# Patient Record
Sex: MALE | Race: BLACK OR AFRICAN AMERICAN | NOT HISPANIC OR LATINO | Employment: FULL TIME | URBAN - METROPOLITAN AREA
[De-identification: names, ages, dates, MRNs, and addresses within clinical notes are randomized per-mention and may not be internally consistent; named-entity substitution may affect disease eponyms.]

---

## 2017-11-01 DIAGNOSIS — E78.5 HYPERLIPIDEMIA: ICD-10-CM

## 2017-11-01 DIAGNOSIS — B20 HUMAN IMMUNODEFICIENCY VIRUS (HIV) DISEASE (HCC): ICD-10-CM

## 2017-11-01 DIAGNOSIS — I10 ESSENTIAL (PRIMARY) HYPERTENSION: ICD-10-CM

## 2017-11-01 DIAGNOSIS — E03.9 HYPOTHYROIDISM: ICD-10-CM

## 2017-11-01 DIAGNOSIS — Z12.5 ENCOUNTER FOR SCREENING FOR MALIGNANT NEOPLASM OF PROSTATE: ICD-10-CM

## 2017-11-01 DIAGNOSIS — Z11.3 ENCOUNTER FOR SCREENING FOR INFECTIONS WITH PREDOMINANTLY SEXUAL MODE OF TRANSMISSION: ICD-10-CM

## 2017-11-30 ENCOUNTER — ALLSCRIPTS OFFICE VISIT (OUTPATIENT)
Dept: OTHER | Facility: OTHER | Age: 57
End: 2017-11-30

## 2018-01-12 ENCOUNTER — ALLSCRIPTS OFFICE VISIT (OUTPATIENT)
Dept: OTHER | Facility: OTHER | Age: 58
End: 2018-01-12

## 2018-01-12 ENCOUNTER — GENERIC CONVERSION - ENCOUNTER (OUTPATIENT)
Dept: OTHER | Facility: OTHER | Age: 58
End: 2018-01-12

## 2018-01-15 NOTE — PROGRESS NOTES
History of Present Illness  HPI: Patient here for routine follow-up for his HIV  Patient has been nonadherent with his follow-up with his appointments  However he has been very adherent taking his Tivicay and Truvada with claimed near 100% adherence  He denies any notable side effects  He has run out of his other medications but continues to take his ART  He is feeling somewhat run down due to running out of some of his neuropsychiatric medications  HIV Follow-up (Brief): The patient is being seen for a routine clinic follow-up of HIV infection  no fever lethargy no depression no night sweats no weight loss no decreased appetite no cough no shortness of breath no thrush no nausea no vomiting no diarrhea no headache      Active Problems    1  AIDS (042) (B20)   2  Bipolar disorder (296 80) (F31 9)   3  Chest pain (786 50) (R07 9)   4  Condyloma acuminata (078 11) (A63 0)   5  Depression with anxiety (300 4) (F41 8)   6  Encounter for screening for infections with a predominantly sexual mode of transmission   (V74 5) (Z11 3)   7  Human immunodeficiency virus (HIV) disease (042) (B20)   8  Hyperlipidemia (272 4) (E78 5)   9  Hypertension (401 9) (I10)   10  Hypothyroidism (244 9) (E03 9)   11  Insomnia (780 52) (G47 00)   12  Migraine, unspecified, not intractable, without status migrainosus (346 90) (G43 909)   13  Need for pneumococcal vaccination (V03 82) (Z23)   14  Screening for colon cancer (V76 51) (Z12 11)   15  Special screening examination for neoplasm of prostate (V76 44) (Z12 5)    Past Medical History    1  History of Visit for pre-operative examination (V72 84) (Z70 043)    Family History    1  Family history of    2  Family history of End Stage Renal Disease    3  Family history of Diabetes Mellitus (V18 0)    4   Family history of Reported Family History Of Heart Disease    Social History    · Being A Social Drinker   · Former smoker (Y79 81) (A60 418)   · Marital History - Single   · Occupation:    Current Meds   1  Benicar 20 MG Oral Tablet (Olmesartan Medoxomil); take 1 tablet by mouth daily; Therapy: 55LZH1644 to (Evaluate:88Vuv8545)  Requested for: 29Jan2016; Last   Rx:29Jan2016 Ordered   2  Butalbital-APAP-Caffeine -40 MG Oral Tablet; TAKE 1 TABLET EVERY 4 TO 6   HOURS AS NEEDED FOR PAIN;   Therapy: 11VHM8224 to Recorded   3  Centrum Silver Oral Tablet; TAKE 1 TABLET DAILY; Therapy: 98KKW2182 to Recorded   4  Fenofibrate 145 MG Oral Tablet (Tricor); take 1 tablet by mouth every day; Therapy: 43IYN8714 to (Evaluate:00Tau1060)  Requested for: 35CJH2173; Last   Rx:24Nov2016 Ordered   5  KlonoPIN 0 5 MG Oral Tablet (ClonazePAM); TAKE 1 TABLET TWICE DAILY AS NEEDED; Therapy: 59QKS1497 to Recorded   6  LamoTRIgine 100 MG Oral Tablet; TAKE 1 TABLET in AM and 1 5 tab in PM;   Therapy: 49BDC4993 to Recorded   7  Levothyroxine Sodium 50 MCG Oral Tablet (Synthroid); take 1 tablet by mouth daily as   directed; Therapy: 51AMQ1088 to (Evaluate:19Apr2017)  Requested for: 89AWC5945; Last   Rx:21Oct2016; Status: ACTIVE - Renewal Denied Ordered   8  Lexapro 20 MG Oral Tablet (Escitalopram Oxalate); TAKE 1 TABLET DAILY; Therapy: 72BMA9255 to (Evaluate:05Pov4791) Recorded   9  Olmesartan Medoxomil 20 MG Oral Tablet; take 1 tablet by mouth daily; Therapy: 06EIH1140 to (Evaluate:87Lxh4469)  Requested for: 76DJN9088; Last   Rx:13Mar2017 Ordered   10  Tivicay 50 MG Oral Tablet; Take 1 tablet daily; Last Rx:01Nov2017 Ordered   11  Truvada 200-300 MG Oral Tablet; Take 1 tablet daily; Last Rx:01Nov2017 Ordered   12  Wellbutrin  MG Oral Tablet Extended Release 24 Hour (BuPROPion HCl ER (XL));    TAKE 1 TABLET BY MOUTH EVERY DAY AS DIRECTED; Therapy: 36WIU8981 to Recorded   13  Zetia 10 MG Oral Tablet (Ezetimibe); take 1 tablet by mouth every day; Therapy: 14LCC5505 to (Evaluate:07Yam2625)  Requested for: 22QNF8909; Last    Rx:10Mar2016 Ordered   14   Zolpidem Tartrate 10 MG Oral Tablet; Therapy: 06VVN1018 to Recorded    Allergies    1  No Known Drug Allergies    Vitals  Signs   Recorded: 29KZG4853 01:33PM   Temperature: 97 1 F  Heart Rate: 68  Respiration: 14  Blood Pressure: 102 mm Hg  Blood Pressure: 72 mm Hg  Height: 6 ft 4 in  Weight: 239 lb   BMI Calculated: 29 09 kg/m2  BSA Calculated: 2 39 m2  O2 Saturation: 98    Physical Exam    Constitutional   General appearance: No acute distress, well appearing and well nourished  Ears, Nose, Mouth, and Throat   Oropharynx: Normal with no erythema, edema, exudate or lesions  Pulmonary   Auscultation of lungs: Clear to auscultation, equal breath sounds bilaterally, no wheezes, no rales, no rhonci  Cardiovascular   Auscultation of heart: Normal rate and rhythm, normal S1 and S2, without murmurs  Examination of extremities for edema and/or varicosities: Normal     Abdomen   Abdomen: Non-tender, no masses  Liver and spleen: No hepatomegaly or splenomegaly  Lymphatic   Palpation of lymph nodes in neck: No lymphadenopathy  Assessment    1  AIDS (042) (B20)   2  Hyperlipidemia (272 4) (E78 5)   3  Condyloma acuminata (078 11) (A63 0)   4  Migraine, unspecified, not intractable, without status migrainosus (346 90) (G43 909)    Plan    1  (1) CBC/PLT/DIFF; Status:Active; Requested for:30Apr2018;    2  (1) CD4-CD8 RATIO PROFILE; Status:Active; Requested for:30Apr2018;    3  (1) COMPREHENSIVE METABOLIC PANEL; Status:Active; Requested for:30Apr2018;    4  (1) HIV-1 RNA QUANTITATIVE; [Do Not Release]; Status:Active; Requested for:30Apr2018;       5  Follow-up visit in 6 months Evaluation and Treatment  Follow-up  Status: Hold For -   Scheduling  Requested for: 31HCZ5066    Discussion/Summary  Discussion Summary:   HIV-doing well on Tivicay and Truvada with an undetectable viral load and a CD4 count in the 700  Continue a RT, recheck labs in 5 months, follow up in 6 months   The patient will continue to try to find a HIV provider much closer to his home  Anal dysplasia-associated with HPV infection  Follow up with Colorectal status post resection    Migraine headaches-the seemed to have resolved and therefore it is very unlikely these are due to Tivicay    Dyslipidemia-will hold on changing the Truvada to Descovy the as the Truvada is more effective at suppressing lipid levels  Would defer any change in Truvada to Descovy to his new HIV provider  Counseling Documentation With Imm: The patient was counseled regarding diagnostic results, instructions for management, prognosis, risks and benefits of treatment options, importance of compliance with treatment  Medication SE Review and Pt Understands Tx: Possible side effects of new medications were reviewed with the patient/guardian today        Signatures   Electronically signed by : Benjamin Evans MD; Nov 30 2017  1:52PM EST                       (Author)    Electronically signed by : Benjamin Evans MD; Nov 30 2017  1:53PM EST                       (Author)

## 2018-01-22 VITALS
RESPIRATION RATE: 14 BRPM | SYSTOLIC BLOOD PRESSURE: 102 MMHG | WEIGHT: 239 LBS | HEART RATE: 68 BPM | BODY MASS INDEX: 29.1 KG/M2 | TEMPERATURE: 97.1 F | DIASTOLIC BLOOD PRESSURE: 72 MMHG | OXYGEN SATURATION: 98 % | HEIGHT: 76 IN

## 2018-01-24 VITALS
DIASTOLIC BLOOD PRESSURE: 72 MMHG | TEMPERATURE: 97.3 F | SYSTOLIC BLOOD PRESSURE: 104 MMHG | HEIGHT: 76 IN | BODY MASS INDEX: 28.98 KG/M2 | RESPIRATION RATE: 16 BRPM | HEART RATE: 72 BPM | WEIGHT: 238 LBS | OXYGEN SATURATION: 99 %

## 2018-02-26 NOTE — PROGRESS NOTES
Assessment    1  Encounter for preventive health examination (V70 0) (Z00 00)    Plan  Bipolar disorder    · From  LamoTRIgine 100 MG Oral Tablet TAKE 1 TABLET in AM and 1 5 tab in  PM To LamoTRIgine 100 MG Oral Tablet take one tab in PM for 14days then one tab bid   · BuPROPion HCl ER (XL) 150 MG Oral Tablet Extended Release 24 Hour; take 1  tab daily for 14days then two tabs daily thereafter  Depression with anxiety    · ClonazePAM 0 5 MG Oral Tablet (KlonoPIN); TAKE 1 TABLET TWICE DAILY AS  NEEDED   · From  Lexapro 20 MG Oral Tablet TAKE 1 TABLET DAILY To Escitalopram  Oxalate 20 MG Oral Tablet take 1/2 tablet daily for 14days then one tab daily thereafter  Health Maintenance    · Begin a limited exercise program ; Status:Complete;   Done: 04VRZ9206   · Brush your teeth {freq1} and floss at least once a day ; Status:Complete;   Done:  63MZQ2274   · Drink plenty of fluids ; Status:Complete;   Done: 90JLK5237   · Limit your use of alcohol to 2 drinks or cans of beer a day ; Status:Complete;   Done:  56HYB2698   · Some eating tips that can help you lose weight ; Status:Complete;   Done: 29LNC8533   · We recommend that you follow these steps to lower your risk of osteoporosis  ;  Status:Complete;   Done: 56ZQP8204  Need for Tdap vaccination    · Tdap    Discussion/Summary  health maintenance visit Impression: healthy adult male  eats an adequate diet Currently, he has an adequate exercise regimen  Prostate cancer screening: the risks and benefits of prostate cancer screening were discussed and prostate cancer screening is current  Testicular cancer screening: testicular cancer screening is not indicated  Colorectal cancer screening: the risks and benefits of colorectal cancer screening were discussed, the next colonoscopy is due now and Patient plans to get a referral for colonoscopy once he establishes with new PCP   The risks and benefits of immunizations were discussed, immunizations are needed and immunizations will be given as outlined in the orders  Advice and education were given regarding nutrition, aerobic exercise, cardiovascular risk reduction, weight bearing exercise, weight loss, alcohol use, calcium supplements and vitamin D supplements  Patient discussion: discussed with the patient, 50 minute visit, greater than half of the time was spent on counseling  bipolar with MDD and anxiety - patient will need to be restarted on his regimen that was discontinued because he could not return for routine follow with his specialist  Plan to establish care with a new PCP and will need to find a new psychiatrist as well  Call office in the interim  Possible side effects of new medications were reviewed with the patient/guardian today  The treatment plan was reviewed with the patient/guardian  The patient/guardian understands and agrees with the treatment plan      History of Present Illness  HM, Adult Male: The patient is being seen for a health maintenance evaluation  The last health maintenance visit was 1 5 year(s) ago  Social History: Work status: working full time and occupation: [employer]  The patient is a former cigarette smoker  He reports occasional alcohol use  General Health: The patient's health since the last visit is described as fair  He has regular dental visits  The patient brushes time(s) a day and flosses time(s) a day  He complains of vision problems (Followed by Dr Jessie Richter)  Vision care includes wearing glasses  He has hearing loss  Hearing is normal  Immunizations status: not up to date The patient needs the following immunization(s): tetanus vaccine and zoster vaccine  Lifestyle:  He consumes a diverse and healthy diet  He is on a low salt and limits junk food diet  He exercises regularly  He exercises 3 or more times per week  Exercise includes walking  He does not use tobacco  The patient is a former cigarette smoker  He consumes alcohol   He reports occasional alcohol use and drinking 2 drinks per week  He typically drinks beer and wine  Reproductive health:  the patient is not sexually active  Screening: Prostate cancer screening includes last prostate-specific antigen testing 12/2017  Colorectal cancer screening includes last colonoscopy done 2010 and Followed by Dr Jimy Kevin  Metabolic screening includes lipid profile performed 12/2017, glucose screening performed 12/2017, thyroid function test performed 12/2017 and no previous DEXA  Safety elements used: seat belt, sunscreen and smoke detector  HPI: 60yo male with bipolar, depression with anxiety, HIV/AIDS, hypothyroidism, migraines, HLD here for yearly physical  He is planning to transfer his care to NH  He started a new position at [employer]  He feels well  He stopped his medications from Neurology, klonipin, lamictal, wellbutrin and lexapro about six months ago  He feels depressed now  At times he has difficulty getting his day started  He has been cycling through thoughts, has had a few episodes of outbursts  Review of Systems    Constitutional: recent weight loss  Cardiovascular: No complaints of slow heart rate, no fast heart rate, no chest pain, no palpitations, no leg claudication, no lower extremity  Respiratory: No complaints of shortness of breath, no wheezing, no cough, no SOB on exertion, no orthopnea or PND  Gastrointestinal: constipation  Genitourinary: No complaints of dysuria, no incontinence, no hesitancy, no nocturia, no genital lesion, no testicular pain  Musculoskeletal: joint stiffness  Neurological: headache, but no numbness, no tingling and no dizziness  Psychiatric: as noted in HPI  Active Problems    1  AIDS (042) (B20)   2  Bipolar disorder (296 80) (F31 9)   3  Condyloma acuminata (078 11) (A63 0)   4  Depression with anxiety (300 4) (F41 8)   5   Encounter for screening for infections with a predominantly sexual mode of transmission (V74 5) (Z11 3)   6  Human immunodeficiency virus (HIV) disease (042) (B20)   7  Hyperlipidemia (272 4) (E78 5)   8  Hypertension (401 9) (I10)   9  Hypothyroidism (244 9) (E03 9)   10  Insomnia (780 52) (G47 00)   11  Migraine, unspecified, not intractable, without status migrainosus (346 90) (G43 909)   12  Screening for colon cancer (V76 51) (Z12 11)   13  Special screening examination for neoplasm of prostate (V76 44) (Z12 5)    Past Medical History    · History of chest pain (V13 89) (U24 940)   · History of Visit for pre-operative examination (V72 84) (V11 125)    Surgical History    · History of Dental Surgery    Family History  Father    · Family history of    · Family history of End Stage Renal Disease  Maternal Grandmother    · Family history of Diabetes Mellitus (V18 0)  Paternal Grandfather    · Family history of Reported Family History Of Heart Disease    Social History    · Being A Social Drinker   · Former smoker (Y51 47) (Z96 060)   · Marital History - Single   · Occupation:   ·  in [place]    Current Meds   1  Centrum Silver Oral Tablet; TAKE 1 TABLET DAILY; Therapy: 84WOY9873 to Recorded   2  Fenofibrate 145 MG Oral Tablet; take 1 tablet by mouth every day; Therapy: 38WOT1789 to ([employer])  Requested for: 56MEI9796; Last   Rx:2017 Ordered   3  Levothyroxine Sodium 50 MCG Oral Tablet; take 1 tablet by mouth daily as directed; Therapy: 29JCH6948 to ([employer])  Requested for: 35BYP4527; Last   Rx:2017 Ordered   4  Olmesartan Medoxomil 20 MG Oral Tablet; take 1 tablet by mouth daily; Therapy: 63ZHR3693 to ([employer])  Requested for: 60UJH7472; Last   Rx:2017 Ordered   5  Tivicay 50 MG Oral Tablet; Take 1 tablet daily; Last Rx:2018 Ordered   6  Truvada 200-300 MG Oral Tablet; Take 1 tablet daily; Last Rx:2018 Ordered   7  Zetia 10 MG Oral Tablet; take 1 tablet by mouth every day;    Therapy: 72ZJH1209 to (Evaluate:21Act0740)  Requested for: 85PNM8498; Last   Rx:10Mar2016 Ordered    Allergies    1  No Known Drug Allergies    Vitals   Recorded: 12Jan2018 12:55PM   Temperature 97 3 F   Heart Rate 72   Respiration 16   Systolic 330   Diastolic 72   Height 6 ft 4 in   Weight 238 lb    BMI Calculated 28 97   BSA Calculated 2 39   O2 Saturation 99     Physical Exam    Constitutional   General appearance: No acute distress, well appearing and well nourished  Head and Face   Head and face: Normal   wears glasses  Ears, Nose, Mouth, and Throat   External inspection of ears and nose: Normal     Otoscopic examination: Tympanic membranes translucent with normal light reflex  Canals patent without erythema  Hearing: Normal     Nasal mucosa, septum, and turbinates: Normal without edema or erythema  Lips, teeth, and gums: Normal, good dentition  Oropharynx: Normal with no erythema, edema, exudate or lesions  Neck   Neck: Supple, symmetric, trachea midline, no masses  Thyroid: Normal, no thyromegaly  Pulmonary   Respiratory effort: No increased work of breathing or signs of respiratory distress  Auscultation of lungs: Clear to auscultation  Cardiovascular   Auscultation of heart: Normal rate and rhythm, normal S1 and S2, no murmurs  Carotid pulses: 2+ bilaterally  Pedal pulses: 2+ bilaterally  Examination of extremities for edema and/or varicosities: Normal     Abdomen   Abdomen: Non-tender, no masses  The abdomen was obese  Bowel sounds were normal  The abdomen was soft and nontender  The abdomen was normal to percussion  Lymphatic   Palpation of lymph nodes in neck: No lymphadenopathy  Musculoskeletal   Gait and station: Normal     Neurologic No focal deficit     Psychiatric   Orientation to person, place and time: Normal     Mood and affect: Normal        Results/Data  12/21/17  total cholesterol 157, triglycerides 47, HDL 52 and LDL 96  PSA 1 19  TSH 0 56  UA neg     Future Appointments    Date/Time Provider Specialty Site   05/31/2018 02:00 PM Elayne Kline MD Infectious Disease [facility] [address] [facility]     Signatures   Electronically signed by : Brian Plata DO; Jan 12 2018  2:45PM EST                       (Author)

## 2018-03-14 DIAGNOSIS — B20 HIV (HUMAN IMMUNODEFICIENCY VIRUS INFECTION) (HCC): Primary | ICD-10-CM

## 2018-03-14 RX ORDER — EMTRICITABINE AND TENOFOVIR DISOPROXIL FUMARATE 200; 300 MG/1; MG/1
1 TABLET, FILM COATED ORAL DAILY
COMMUNITY
End: 2018-08-16 | Stop reason: SDUPTHER

## 2018-03-14 RX ORDER — EMTRICITABINE AND TENOFOVIR DISOPROXIL FUMARATE 200; 300 MG/1; MG/1
1 TABLET, FILM COATED ORAL DAILY
Qty: 30 TABLET | Refills: 2 | Status: SHIPPED | OUTPATIENT
Start: 2018-03-14 | End: 2018-06-21 | Stop reason: SDUPTHER

## 2018-04-30 DIAGNOSIS — B20 HUMAN IMMUNODEFICIENCY VIRUS (HIV) DISEASE (HCC): ICD-10-CM

## 2018-06-21 DIAGNOSIS — B20 HIV (HUMAN IMMUNODEFICIENCY VIRUS INFECTION) (HCC): ICD-10-CM

## 2018-06-23 RX ORDER — EMTRICITABINE AND TENOFOVIR DISOPROXIL FUMARATE 200; 300 MG/1; MG/1
1 TABLET, FILM COATED ORAL DAILY
Qty: 30 TABLET | Refills: 2 | Status: SHIPPED | OUTPATIENT
Start: 2018-06-23 | End: 2018-09-12 | Stop reason: ALTCHOICE

## 2018-08-16 ENCOUNTER — OFFICE VISIT (OUTPATIENT)
Dept: INFECTIOUS DISEASES | Facility: CLINIC | Age: 58
End: 2018-08-16
Payer: COMMERCIAL

## 2018-08-16 VITALS
HEIGHT: 75 IN | RESPIRATION RATE: 16 BRPM | BODY MASS INDEX: 30.44 KG/M2 | TEMPERATURE: 97.3 F | SYSTOLIC BLOOD PRESSURE: 132 MMHG | WEIGHT: 244.8 LBS | DIASTOLIC BLOOD PRESSURE: 82 MMHG | HEART RATE: 87 BPM

## 2018-08-16 DIAGNOSIS — B20 HIV (HUMAN IMMUNODEFICIENCY VIRUS INFECTION) (HCC): ICD-10-CM

## 2018-08-16 DIAGNOSIS — Z76.89 ENCOUNTER FOR ASSESSMENT OF SEXUALLY TRANSMITTED DISEASE EXPOSURE: Primary | ICD-10-CM

## 2018-08-16 DIAGNOSIS — Z11.3 ENCOUNTER FOR SCREENING EXAMINATION FOR SEXUALLY TRANSMITTED DISEASE: Primary | ICD-10-CM

## 2018-08-16 DIAGNOSIS — K62.82 ANAL DYSPLASIA: ICD-10-CM

## 2018-08-16 DIAGNOSIS — E78.5 DYSLIPIDEMIA: ICD-10-CM

## 2018-08-16 DIAGNOSIS — Z11.3 ENCOUNTER FOR SCREENING EXAMINATION FOR SEXUALLY TRANSMITTED DISEASE: ICD-10-CM

## 2018-08-16 PROCEDURE — 99214 OFFICE O/P EST MOD 30 MIN: CPT | Performed by: INTERNAL MEDICINE

## 2018-08-16 NOTE — PROGRESS NOTES
Progress Note - Infectious Disease   Callum Cee 60 y o  male MRN: [id number]  Unit/Bed#:  Encounter: [id number]      Impression/Plan:  1  HIV-doing well on Tivicay and Truvada with an undetectable viral load and a CD4 count of greater than 800  Will change the Truvada to Descovy to decrease the risk of nephrotoxicity in bone demineralization  Recheck labs in 5 months and follow up in 6 months  Stressed adherence  2   Anal dysplasia-patient continues to follow up with Colorectal now in [place]  He has repeat colonoscopy scheduled  3   Dyslipidemia-patient is a new primary care physician who will be following up with his lipid profile  Patient was provided medication, adherence and prevention education    Subjective:  Routine follow-up for HIV  Patient claims near 100% adherence with Tivicay and Truvada  Patient denies any notable side effects  Overall the feeling well  The patient denies any fever chills or sweats, denies any nausea vomiting or diarrhea, denies any cough or shortness of breath  ROS: A complete 12 point ROS is negative other than that noted in the HPI    Objective:  Vitals:  Vitals:    08/16/18 1501   BP: 132/82   Pulse: 87   Resp: 16   Temp: (!) 97 3 °F (36 3 °C)   Weight: 111 kg (244 lb 12 8 oz)   Height: 6' 3" (1 905 m)       Physical Exam:   General Appearance:  Alert, interactive, appearing well,  nontoxic, no acute distress  Neck:   Supple without lymphadenopathy, no thyromegaly or masses   Throat: Oropharynx moist without lesions  Lungs:   Clear to auscultation bilaterally; no wheezes, rhonchi or rales; respirations unlabored   Heart:  RRR; no murmur, rub or gallop   Abdomen:   Soft, non-tender, non-distended, positive bowel sounds  Extremities: No clubbing, cyanosis or edema   Skin: No new rashes or lesions  No draining wounds noted           Labs, Imaging, & Other studies:   All pertinent labs and imaging studies were personally reviewed    Creatinine 1 23, WBC 8 4, CD4 851,, HIV RNA less than 40 copies, liver function tests normal      Current Outpatient Prescriptions:     buPROPion (WELLBUTRIN XL) 150 mg 24 hr tablet, Take 150 mg by mouth every morning, Disp: , Rfl: 11    butalbital-acetaminophen-caffeine (FIORICET,ESGIC) -40 mg per tablet, Take 1 tablet by mouth every 6 (six) hours, Disp: , Rfl:     clonazePAM (KlonoPIN) 0 5 mg tablet, Take 0 5 mg by mouth 2 (two) times a day, Disp: , Rfl:     dolutegravir (TIVICAY) 50 MG TABS, Take 1 tablet (50 mg total) by mouth daily for 90 days, Disp: 30 tablet, Rfl: 2    emtricitabine-tenofovir (TRUVADA) 200-300 mg per tablet, Take 1 tablet by mouth daily, Disp: , Rfl:     emtricitabine-tenofovir (TRUVADA) 200-300 mg per tablet, Take 1 tablet by mouth daily for 90 days, Disp: 30 tablet, Rfl: 2    escitalopram (LEXAPRO) 20 mg tablet, Take 20 mg by mouth daily, Disp: , Rfl: 11    ezetimibe (ZETIA) 10 mg tablet, , Disp: , Rfl:     fenofibrate (TRICOR) 145 mg tablet, Take 145 mg by mouth daily, Disp: , Rfl: 5    imiquimod (ALDARA) 5 % cream, APPLY TOPICALLY THREE TIMES A WEEK , Disp: , Rfl: 3    lamoTRIgine (LaMICtal) 100 mg tablet, Take 100 mg by mouth, Disp: , Rfl:     levothyroxine 50 mcg tablet, Take 50 mcg by mouth, Disp: , Rfl:     olmesartan (BENICAR) 20 mg tablet, Take 20 mg by mouth daily, Disp: , Rfl: 3

## 2018-08-20 ENCOUNTER — TELEPHONE (OUTPATIENT)
Dept: INFECTIOUS DISEASES | Facility: CLINIC | Age: 58
End: 2018-08-20

## 2018-08-20 NOTE — TELEPHONE ENCOUNTER
Breana Alejandraes called because stating that his prescriptions were sent to [facility] and not [facility]  On our end they were sent to [facility], I told him to call [facility] and confirm that they have the orders  He will call back if there are any issues

## 2018-08-31 RX ORDER — FENOFIBRATE 145 MG/1
TABLET, COATED ORAL
Qty: 30 TABLET | Refills: 0 | OUTPATIENT
Start: 2018-08-31

## 2018-09-12 DIAGNOSIS — B20 HIV (HUMAN IMMUNODEFICIENCY VIRUS INFECTION) (HCC): ICD-10-CM

## 2018-12-21 LAB
C TRACH RRNA SPEC QL PROBE: NEGATIVE
N GONORRHOEA RRNA SPEC QL PROBE: NEGATIVE

## 2019-01-10 ENCOUNTER — OFFICE VISIT (OUTPATIENT)
Dept: INFECTIOUS DISEASES | Facility: CLINIC | Age: 59
End: 2019-01-10
Payer: COMMERCIAL

## 2019-01-10 VITALS
WEIGHT: 244.3 LBS | HEART RATE: 68 BPM | BODY MASS INDEX: 29.75 KG/M2 | HEIGHT: 76 IN | RESPIRATION RATE: 16 BRPM | SYSTOLIC BLOOD PRESSURE: 118 MMHG | DIASTOLIC BLOOD PRESSURE: 78 MMHG | TEMPERATURE: 97.1 F

## 2019-01-10 DIAGNOSIS — E78.5 DYSLIPIDEMIA: ICD-10-CM

## 2019-01-10 DIAGNOSIS — B20 HIV (HUMAN IMMUNODEFICIENCY VIRUS INFECTION) (HCC): Primary | ICD-10-CM

## 2019-01-10 DIAGNOSIS — K62.82 ANAL DYSPLASIA: ICD-10-CM

## 2019-01-10 DIAGNOSIS — J06.9 UPPER RESPIRATORY TRACT INFECTION, UNSPECIFIED TYPE: ICD-10-CM

## 2019-01-10 DIAGNOSIS — B20 HIV DISEASE (HCC): Primary | ICD-10-CM

## 2019-01-10 PROCEDURE — 99214 OFFICE O/P EST MOD 30 MIN: CPT | Performed by: INTERNAL MEDICINE

## 2019-01-10 NOTE — PROGRESS NOTES
Progress Note - Infectious Disease   Matthias Hilton 60 y o  male MRN: [id number]  Unit/Bed#:  Encounter: [id number]      Impression/Plan:  1  HIV-doing well on Tivicay and Descovy with an undetectable viral load and a CD4 count of greater than 600  Recheck labs in 5 months and follow up in 6 months  Stressed adherence  Recommended the patient find a HIV provider that is more local to was area and he will attempt to do this but if not he will follow up with me in 6 months      2  Anal dysplasia-patient continues to follow up with Colorectal now in [place]  He has repeat colonoscopy scheduled      3  Dyslipidemia-patient is a new primary care physician who will be following up with his lipid profile  4   Viral upper respiratory infection-no clinical evidence of an active lower respiratory infection at this time  Symptomatic treatment for now  Patient was provided medication, adherence and prevention education     Subjective:  Routine follow-up for HIV  Patient claims 100% adherence with Tivicay and Descovy  Patient denies any notable side effects  Overall the feeling well  The patient denies any fever chills or sweats, denies any nausea vomiting or diarrhea  Patient recently with a persistent cough illness that lasted several weeks but is now improving  ROS: A complete 12 point ROS is negative other than that noted in the HPI    Followup portions patient history reviewed and updated as: Allergies, current medications, past medical history, past social history, past surgical history, and the problem list    Objective:  Vitals:  Vitals:    01/10/19 0938   BP: 118/78   Pulse: 68   Resp: 16   Temp: (!) 97 1 °F (36 2 °C)   Weight: 111 kg (244 lb 4 8 oz)   Height: 6' 4" (1 93 m)       Physical Exam:   General Appearance:  Alert, interactive, appearing well,  nontoxic, no acute distress     Neck:   Supple without lymphadenopathy, no thyromegaly or masses   Throat: Oropharynx moist without lesions  Lungs:   Clear to auscultation bilaterally; no wheezes, rhonchi or rales; respirations unlabored   Heart:  RRR; no murmur, rub or gallop   Abdomen:   Soft, non-tender, non-distended, positive bowel sounds  Extremities: No clubbing, cyanosis or edema   Skin: No new rashes or lesions  No draining wounds noted         Labs, Imaging, & Other studies:   All pertinent labs and imaging studies were personally reviewed    HIV RNA less than 40 copies, CD4 692, creatinine 1 17, liver function tests normal, white cell count 6 7, QuantiFERON gold negative, urinalysis negative, urine GC and Chlamydia negative      Current Outpatient Prescriptions:     buPROPion (WELLBUTRIN XL) 150 mg 24 hr tablet, Take 150 mg by mouth every morning, Disp: , Rfl: 11    butalbital-acetaminophen-caffeine (FIORICET,ESGIC) -40 mg per tablet, Take 1 tablet by mouth every 6 (six) hours as needed  , Disp: , Rfl:     clonazePAM (KlonoPIN) 0 5 mg tablet, Take 0 5 mg by mouth 2 (two) times a day, Disp: , Rfl:     dolutegravir (TIVICAY) 50 MG TABS, Take 1 tablet (50 mg total) by mouth daily for 180 days, Disp: 30 tablet, Rfl: 5    emtricitabine-tenofovir AF (DESCOVY) 200-25 MG tablet, Take 1 tablet by mouth daily for 180 days, Disp: 30 tablet, Rfl: 5    escitalopram (LEXAPRO) 20 mg tablet, Take 20 mg by mouth daily, Disp: , Rfl: 11    fenofibrate (TRICOR) 145 mg tablet, Take 145 mg by mouth daily, Disp: , Rfl: 5    imiquimod (ALDARA) 5 % cream, APPLY TOPICALLY THREE TIMES A WEEK , Disp: , Rfl: 3    lamoTRIgine (LaMICtal) 100 mg tablet, Take 100 mg by mouth 2 (two) times a day  , Disp: , Rfl:     levothyroxine 50 mcg tablet, Take 50 mcg by mouth, Disp: , Rfl:     olmesartan (BENICAR) 20 mg tablet, Take 20 mg by mouth daily, Disp: , Rfl: 3

## 2019-03-06 DIAGNOSIS — B20 HIV (HUMAN IMMUNODEFICIENCY VIRUS INFECTION) (HCC): ICD-10-CM

## 2019-07-31 ENCOUNTER — TELEPHONE (OUTPATIENT)
Dept: INFECTIOUS DISEASES | Facility: CLINIC | Age: 59
End: 2019-07-31

## 2019-07-31 NOTE — TELEPHONE ENCOUNTER
Contacted pt with reminder to have labs done this week for review of results at appt next week with Dr Nikki Ramirez

## 2019-08-05 ENCOUNTER — TELEPHONE (OUTPATIENT)
Dept: INFECTIOUS DISEASES | Facility: CLINIC | Age: 59
End: 2019-08-05

## 2019-08-05 NOTE — TELEPHONE ENCOUNTER
Contacted pt as we have not received any labs for pt's appt 8/8 with Dr Jane Borden  If pt has not had labs drawn yet, we will need to reschedule his appt  Requested pt call back at his earliest convenience so that we can either reschedule or obtain his lab results

## 2019-08-07 ENCOUNTER — TELEPHONE (OUTPATIENT)
Dept: INFECTIOUS DISEASES | Facility: CLINIC | Age: 59
End: 2019-08-07

## 2019-08-07 NOTE — TELEPHONE ENCOUNTER
Pt called to cancel his appt for 8/8  States he cannot make it in until next month but will need to call back to reschedule  Reminded him of labs to be drawn, also

## 2019-08-21 DIAGNOSIS — B20 HIV (HUMAN IMMUNODEFICIENCY VIRUS INFECTION) (HCC): ICD-10-CM

## 2019-08-29 RX ORDER — EMTRICITABINE AND TENOFOVIR ALAFENAMIDE 200; 25 MG/1; MG/1
TABLET ORAL
Qty: 30 TABLET | Refills: 0 | Status: SHIPPED | OUTPATIENT
Start: 2019-08-29 | End: 2019-11-26 | Stop reason: SDUPTHER

## 2019-08-29 RX ORDER — DOLUTEGRAVIR SODIUM 50 MG/1
TABLET, FILM COATED ORAL
Qty: 30 TABLET | Refills: 0 | Status: SHIPPED | OUTPATIENT
Start: 2019-08-29 | End: 2019-11-26 | Stop reason: SDUPTHER

## 2019-11-25 DIAGNOSIS — B20 HIV (HUMAN IMMUNODEFICIENCY VIRUS INFECTION) (HCC): ICD-10-CM

## 2019-11-26 DIAGNOSIS — B20 HIV (HUMAN IMMUNODEFICIENCY VIRUS INFECTION) (HCC): ICD-10-CM

## 2019-11-26 RX ORDER — DOLUTEGRAVIR SODIUM 50 MG/1
TABLET, FILM COATED ORAL
Qty: 30 TABLET | Refills: 0 | Status: SHIPPED | OUTPATIENT
Start: 2019-11-26

## 2019-11-26 RX ORDER — DOLUTEGRAVIR SODIUM 50 MG/1
TABLET, FILM COATED ORAL
Qty: 30 TABLET | Refills: 0 | OUTPATIENT
Start: 2019-11-26

## 2019-11-26 NOTE — TELEPHONE ENCOUNTER
Dr Christos Sutton received automatic refill request from pt's pharmacy for [address]  Pt had cancelled appt in August and did not have labs drawn  Pt did not call back to reschedule  I contacted pt today and scheduled him for Jan 16  I advised him that we will send 2 mo of refills of his meds but if he does not get labs and come for this appt, we will no longer be able to prescribe his medications  Pt verbalized understanding  He lost his lab scripts so I will send them to him again  Pt must have office appts every 6 months in order for us to continue his care and refill his meds

## 2020-02-06 ENCOUNTER — TELEPHONE (OUTPATIENT)
Dept: INFECTIOUS DISEASES | Facility: CLINIC | Age: 60
End: 2020-02-06
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
